# Patient Record
Sex: FEMALE | Race: WHITE | Employment: OTHER | URBAN - METROPOLITAN AREA
[De-identification: names, ages, dates, MRNs, and addresses within clinical notes are randomized per-mention and may not be internally consistent; named-entity substitution may affect disease eponyms.]

---

## 2018-02-15 ENCOUNTER — ANESTHESIA EVENT (OUTPATIENT)
Dept: SURGERY | Age: 73
End: 2018-02-15
Payer: MEDICARE

## 2018-02-15 ENCOUNTER — ANESTHESIA (OUTPATIENT)
Dept: SURGERY | Age: 73
End: 2018-02-15
Payer: MEDICARE

## 2018-02-15 ENCOUNTER — HOSPITAL ENCOUNTER (OUTPATIENT)
Age: 73
Setting detail: OUTPATIENT SURGERY
Discharge: HOME | End: 2018-02-15
Attending: OPHTHALMOLOGY | Admitting: OPHTHALMOLOGY
Payer: MEDICARE

## 2018-02-15 VITALS
WEIGHT: 126 LBS | SYSTOLIC BLOOD PRESSURE: 185 MMHG | HEART RATE: 69 BPM | TEMPERATURE: 98.6 F | OXYGEN SATURATION: 97 % | RESPIRATION RATE: 16 BRPM | DIASTOLIC BLOOD PRESSURE: 76 MMHG

## 2018-02-15 DIAGNOSIS — H44.001 ENDOPHTHALMITIS, ACUTE, RIGHT: Primary | ICD-10-CM

## 2018-02-15 LAB
GLUCOSE BLD STRIP.AUTO-MCNC: 93 MG/DL (ref 65–100)
POTASSIUM BLD-SCNC: 4.7 MMOL/L (ref 3.5–5.1)

## 2018-02-15 PROCEDURE — 76210000020 HC REC RM PH II FIRST 0.5 HR: Performed by: OPHTHALMOLOGY

## 2018-02-15 PROCEDURE — 74011250636 HC RX REV CODE- 250/636

## 2018-02-15 PROCEDURE — 87102 FUNGUS ISOLATION CULTURE: CPT | Performed by: OPHTHALMOLOGY

## 2018-02-15 PROCEDURE — 76210000063 HC OR PH I REC FIRST 0.5 HR: Performed by: OPHTHALMOLOGY

## 2018-02-15 PROCEDURE — 82962 GLUCOSE BLOOD TEST: CPT

## 2018-02-15 PROCEDURE — 87070 CULTURE OTHR SPECIMN AEROBIC: CPT | Performed by: OPHTHALMOLOGY

## 2018-02-15 PROCEDURE — 76060000032 HC ANESTHESIA 0.5 TO 1 HR: Performed by: OPHTHALMOLOGY

## 2018-02-15 PROCEDURE — 74011250636 HC RX REV CODE- 250/636: Performed by: OPHTHALMOLOGY

## 2018-02-15 PROCEDURE — 87075 CULTR BACTERIA EXCEPT BLOOD: CPT | Performed by: OPHTHALMOLOGY

## 2018-02-15 PROCEDURE — 74011000258 HC RX REV CODE- 258: Performed by: OPHTHALMOLOGY

## 2018-02-15 PROCEDURE — 77030018846 HC SOL IRR STRL H20 ICUM -A: Performed by: OPHTHALMOLOGY

## 2018-02-15 PROCEDURE — 74011000250 HC RX REV CODE- 250: Performed by: OPHTHALMOLOGY

## 2018-02-15 PROCEDURE — 77030032623 HC KT VITRCMY TOT PLS ALCN -F: Performed by: OPHTHALMOLOGY

## 2018-02-15 PROCEDURE — 74011250636 HC RX REV CODE- 250/636: Performed by: ANESTHESIOLOGY

## 2018-02-15 PROCEDURE — 77030003029 HC SUT VCRL J&J -B: Performed by: OPHTHALMOLOGY

## 2018-02-15 PROCEDURE — 77030018838 HC SOL IRR OPTH ALCN -B: Performed by: OPHTHALMOLOGY

## 2018-02-15 PROCEDURE — 76010000154 HC OR TIME FIRST 0.5 HR: Performed by: OPHTHALMOLOGY

## 2018-02-15 PROCEDURE — 84132 ASSAY OF SERUM POTASSIUM: CPT

## 2018-02-15 RX ORDER — NEOMYCIN SULFATE, POLYMYXIN B SULFATE, AND DEXAMETHASONE 3.5; 10000; 1 MG/G; [USP'U]/G; MG/G
OINTMENT OPHTHALMIC AS NEEDED
Status: DISCONTINUED | OUTPATIENT
Start: 2018-02-15 | End: 2018-02-15 | Stop reason: HOSPADM

## 2018-02-15 RX ORDER — LORAZEPAM 0.5 MG/1
0.5 TABLET ORAL
COMMUNITY

## 2018-02-15 RX ORDER — BUPIVACAINE HYDROCHLORIDE 5 MG/ML
INJECTION, SOLUTION EPIDURAL; INTRACAUDAL AS NEEDED
Status: DISCONTINUED | OUTPATIENT
Start: 2018-02-15 | End: 2018-02-15 | Stop reason: HOSPADM

## 2018-02-15 RX ORDER — LOSARTAN POTASSIUM 50 MG/1
50 TABLET ORAL 2 TIMES DAILY
COMMUNITY

## 2018-02-15 RX ORDER — LEVOTHYROXINE SODIUM 112 UG/1
112 TABLET ORAL
COMMUNITY

## 2018-02-15 RX ORDER — MULTIVIT WITH MINERALS/HERBS
1 TABLET ORAL DAILY
COMMUNITY

## 2018-02-15 RX ORDER — MIDAZOLAM HYDROCHLORIDE 1 MG/ML
INJECTION, SOLUTION INTRAMUSCULAR; INTRAVENOUS AS NEEDED
Status: DISCONTINUED | OUTPATIENT
Start: 2018-02-15 | End: 2018-02-15 | Stop reason: HOSPADM

## 2018-02-15 RX ORDER — ATROPINE SULFATE 10 MG/ML
SOLUTION/ DROPS OPHTHALMIC AS NEEDED
Status: DISCONTINUED | OUTPATIENT
Start: 2018-02-15 | End: 2018-02-15 | Stop reason: HOSPADM

## 2018-02-15 RX ORDER — INSULIN ASPART 100 [IU]/ML
INJECTION, SOLUTION INTRAVENOUS; SUBCUTANEOUS
COMMUNITY

## 2018-02-15 RX ORDER — TETRACAINE HYDROCHLORIDE 5 MG/ML
SOLUTION OPHTHALMIC AS NEEDED
Status: DISCONTINUED | OUTPATIENT
Start: 2018-02-15 | End: 2018-02-15 | Stop reason: HOSPADM

## 2018-02-15 RX ORDER — FLUTICASONE PROPIONATE 50 MCG
2 SPRAY, SUSPENSION (ML) NASAL DAILY
COMMUNITY

## 2018-02-15 RX ORDER — CHLORHEXIDINE GLUCONATE 1.2 MG/ML
15 RINSE ORAL 2 TIMES DAILY
COMMUNITY

## 2018-02-15 RX ORDER — TRIAMCINOLONE ACETONIDE 40 MG/ML
INJECTION, SUSPENSION INTRA-ARTICULAR; INTRAMUSCULAR AS NEEDED
Status: DISCONTINUED | OUTPATIENT
Start: 2018-02-15 | End: 2018-02-15 | Stop reason: HOSPADM

## 2018-02-15 RX ORDER — LIDOCAINE HYDROCHLORIDE 20 MG/ML
INJECTION, SOLUTION INFILTRATION; PERINEURAL AS NEEDED
Status: DISCONTINUED | OUTPATIENT
Start: 2018-02-15 | End: 2018-02-15 | Stop reason: HOSPADM

## 2018-02-15 RX ORDER — PANTOPRAZOLE SODIUM 40 MG/1
40 TABLET, DELAYED RELEASE ORAL DAILY
COMMUNITY

## 2018-02-15 RX ORDER — HYOSCYAMINE SULFATE 0.12 MG/1
0.12 TABLET SUBLINGUAL
COMMUNITY

## 2018-02-15 RX ORDER — SODIUM CHLORIDE, SODIUM LACTATE, POTASSIUM CHLORIDE, CALCIUM CHLORIDE 600; 310; 30; 20 MG/100ML; MG/100ML; MG/100ML; MG/100ML
75 INJECTION, SOLUTION INTRAVENOUS CONTINUOUS
Status: DISCONTINUED | OUTPATIENT
Start: 2018-02-15 | End: 2018-02-15 | Stop reason: HOSPADM

## 2018-02-15 RX ORDER — INSULIN GLARGINE 100 [IU]/ML
12 INJECTION, SOLUTION SUBCUTANEOUS
COMMUNITY

## 2018-02-15 RX ORDER — PREDNISOLONE ACETATE 10 MG/ML
SUSPENSION/ DROPS OPHTHALMIC AS NEEDED
Status: DISCONTINUED | OUTPATIENT
Start: 2018-02-15 | End: 2018-02-15 | Stop reason: HOSPADM

## 2018-02-15 RX ADMIN — VANCOMYCIN HYDROCHLORIDE 900 MG: 1 INJECTION, POWDER, LYOPHILIZED, FOR SOLUTION INTRAVENOUS at 12:17

## 2018-02-15 RX ADMIN — MIDAZOLAM HYDROCHLORIDE 0.5 MG: 1 INJECTION, SOLUTION INTRAMUSCULAR; INTRAVENOUS at 12:12

## 2018-02-15 RX ADMIN — SODIUM CHLORIDE, SODIUM LACTATE, POTASSIUM CHLORIDE, AND CALCIUM CHLORIDE 75 ML/HR: 600; 310; 30; 20 INJECTION, SOLUTION INTRAVENOUS at 12:00

## 2018-02-15 RX ADMIN — VANCOMYCIN HYDROCHLORIDE 100 MG: 1 INJECTION, POWDER, LYOPHILIZED, FOR SOLUTION INTRAVENOUS at 12:17

## 2018-02-15 RX ADMIN — CEFTAZIDIME 800 MG: 1 INJECTION, POWDER, FOR SOLUTION INTRAMUSCULAR; INTRAVENOUS at 12:17

## 2018-02-15 RX ADMIN — MIDAZOLAM HYDROCHLORIDE 0.5 MG: 1 INJECTION, SOLUTION INTRAMUSCULAR; INTRAVENOUS at 12:01

## 2018-02-15 RX ADMIN — CEFTAZIDIME 200 MG: 1 INJECTION, POWDER, FOR SOLUTION INTRAMUSCULAR; INTRAVENOUS at 12:17

## 2018-02-15 NOTE — H&P
[address]  HISTORY AND PHYSICAL      Madeline PRAKASH  MR#: [id number]  : 1945  ACCOUNT #: [de-identified]   ADMIT DATE: 02/15/2018    CHIEF COMPLAINT:  Decreased vision of the right eye. HISTORY OF PRESENT ILLNESS:  The patient is a 70-year-old white female with a long history of diabetes. She has developed diabetic macular edema of the right eye and underwent intraocular steroid injection of the right eye to help control the diabetic macular edema 3 days ago. Starting the evening after that injection, she began to notice a decreased vision. She called yesterday stating she had no pain in her vision but noticed a white streak at the bottom of the iris. She said her vision was cloudy as usual.  She came in this morning on 02/15/2018 where we found that she has a 10% hypopyon with conjunctival injection. The vision has dropped from 20/100 to counting fingers. She has an endophthalmitis of the right eye. She will be admitted for vitreous tap for samples for culture and injection of intraocular antibiotics consisting of vancomycin and ceftazidime. PAST MEDICAL HISTORY:  Positive for cataract surgery in both eyes, her long history of insulin-dependent type 2 diabetes, hypertension, thyroid disease, osteoarthritis. PAST SURGICAL HISTORY:  Include hysterectomy, knee surgery, D and C, tonsillectomy, deviated septum and the cataract surgeries. SOCIAL HISTORY:  She denies drinking and has never smoked. FAMILY HISTORY:  Noncontributory. MEDICATIONS:  Cephalexin 500 mg, Coreg, Humalog insulin, Lantus insulin and Synthroid. Her ophthalmic medications include Combigan one drop in the right eye twice a day. She takes PreserVision vitamins one twice a day and is on Prolensa drops twice a day in the right eye. ALLERGIES:  INCLUDE DEMEROL. PHYSICAL EXAMINATION:  She is awake, alert, oriented x3 with an appropriate affect.   Her eye examination shows the right eye vision to be counting fingers. The left eye vision is 20/25. She has a posterior chamber intraocular lenses in place with an open capsule in both eyes. The right eye, prior to this, had mild background diabetic retinopathy. The left eye has mild background diabetic retinopathy with no macular edema. She is a steroid responder and that is the reason that she is on glaucoma drops in the right eye. She has had previous steroid injections. Chest is clear bilaterally. Heart has a regular rate and rhythm. IMPRESSION:  Acute endophthalmitis of the right eye. PLAN:  Vitreous tap for cultures and intraocular injection of antibiotics. RUBÉN HANKINS  [facility] SINGH/JOSE RAUL  D: 02/15/2018 09:13     T: 02/15/2018 09:45  JOB #: [id number]

## 2018-02-15 NOTE — IP AVS SNAPSHOT
[address] [place] [address] 
[phone] Patient: Merlinda Bank MRN: [id number] :1945 A check srikanth indicates which time of day the medication should be taken. My Medications CONTINUE taking these medications Instructions Each Dose to Equal  
 Morning Noon Evening Bedtime B COMPLEX 1 tablet Generic drug:  b complex vitamins Your last dose was: Your next dose is: Take 1 Tab by mouth daily. 1 Tab FLONASE ALLERGY RELIEF 50 mcg/actuation nasal spray Generic drug:  fluticasone Your last dose was: Your next dose is: 2 Sprays by Both Nostrils route daily. 2 Spray  
    
   
   
   
  
 hyoscyamine SL 0.125 mg SL tablet Commonly known as:  LEVSIN/SL Your last dose was: Your next dose is:    
   
   
 0.125 mg by SubLINGual route every four (4) hours as needed for Cramping.  
 0.125 mg  
    
   
   
   
  
 LANTUS U-100 INSULIN 100 unit/mL injection Generic drug:  insulin glargine Your last dose was: Your next dose is:    
   
   
 12 Units by SubCUTAneous route nightly. Indications: 12units am 3 units hs  
 12 Units  
    
   
   
   
  
 levothyroxine 112 mcg tablet Commonly known as:  SYNTHROID Your last dose was: Your next dose is: Take 112 mcg by mouth Daily (before breakfast). 112 mcg LORazepam 0.5 mg tablet Commonly known as:  ATIVAN Your last dose was: Your next dose is: Take 0.5 mg by mouth nightly. 0.5 mg  
    
   
   
   
  
 losartan 50 mg tablet Commonly known as:  COZAAR Your last dose was: Your next dose is: Take 50 mg by mouth two (2) times a day. 50 mg NovoLOG U-100 Insulin aspart 100 unit/mL injection Generic drug:  insulin aspart U-100 Your last dose was: Your next dose is:    
   
   
 by SubCUTAneous route. Indications: sliding scale PERIDEX 0.12 % solution Generic drug:  chlorhexidine Your last dose was: Your next dose is:    
   
   
 15 mL by Swish and Spit route two (2) times a day. 15 mL PROBIOTIC 4X 10-15 mg Tbec Generic drug:  B.infantis-B.ani-B.long-B.bifi Your last dose was: Your next dose is: Take  by mouth. PROTONIX 40 mg tablet Generic drug:  pantoprazole Your last dose was: Your next dose is: Take 40 mg by mouth daily.   
 40 mg

## 2018-02-15 NOTE — IP AVS SNAPSHOT
[address] [facility] [place], operated by [facility] 
 
 
 [address] [facility] [address] [facility] 
[phone] Patient: Brian Vizcarra MRN: [id number] :1945 About your hospitalization You were admitted on:  February 15, 2018 You last received care in the:  [facility] OP PACU You were discharged on:  February 15, 2018 Why you were hospitalized Your primary diagnosis was:  Not on File Follow-up Information Follow up With Details Comments Contact Info Davy Cobb MD   [address] [place] [address] [address] [facility] [address] 
[phone] Abilio Wilde MD  as scheduled [address] [employer] [address] [place] [address] 
[phone] Discharge Orders Procedure Order Date Status Priority Quantity Spec Type Associated Dx NURSING-MISCELLANEOUS: positioning-head up tonight 02/15/18 1242 Normal Routine 1  Endophthalmitis, acute, right [[id number]] Questions: Description of Order:  positioning-head up tonight DISCONTINUE IV 02/15/18 1242 Future Routine 1  Endophthalmitis, acute, right [[id number]] Comments:  Discontinue IV once tolerating PO well DRESSING,DO NOT REMOVE 02/15/18 124 Normal Routine 1  Endophthalmitis, acute, right [[id number]] Comments:  Keep the patch and shield in place at all times until you return to my office as scheduled DIET DIABETIC CONSISTENT CARB 02/15/18 1242 Normal Routine 1  Endophthalmitis, acute, right [[id number]] Comments:  Advance as tolerated Questions: Total Calories:  Consistent Carb 1500-1600kcal  
        
  
 A check srikanth indicates which time of day the medication should be taken. My Medications CONTINUE taking these medications Instructions Each Dose to Equal  
 Morning Noon Evening Bedtime B COMPLEX 1 tablet Generic drug:  b complex vitamins Your last dose was: Your next dose is: Take 1 Tab by mouth daily. 1 Tab FLONASE ALLERGY RELIEF 50 mcg/actuation nasal spray Generic drug:  fluticasone Your last dose was: Your next dose is: 2 Sprays by Both Nostrils route daily. 2 Spray  
    
   
   
   
  
 hyoscyamine SL 0.125 mg SL tablet Commonly known as:  LEVSIN/SL Your last dose was: Your next dose is:    
   
   
 0.125 mg by SubLINGual route every four (4) hours as needed for Cramping.  
 0.125 mg  
    
   
   
   
  
 LANTUS U-100 INSULIN 100 unit/mL injection Generic drug:  insulin glargine Your last dose was: Your next dose is:    
   
   
 12 Units by SubCUTAneous route nightly. Indications: 12units am 3 units hs  
 12 Units  
    
   
   
   
  
 levothyroxine 112 mcg tablet Commonly known as:  SYNTHROID Your last dose was: Your next dose is: Take 112 mcg by mouth Daily (before breakfast). 112 mcg LORazepam 0.5 mg tablet Commonly known as:  ATIVAN Your last dose was: Your next dose is: Take 0.5 mg by mouth nightly. 0.5 mg  
    
   
   
   
  
 losartan 50 mg tablet Commonly known as:  COZAAR Your last dose was: Your next dose is: Take 50 mg by mouth two (2) times a day. 50 mg NovoLOG U-100 Insulin aspart 100 unit/mL injection Generic drug:  insulin aspart U-100 Your last dose was: Your next dose is:    
   
   
 by SubCUTAneous route. Indications: sliding scale PERIDEX 0.12 % solution Generic drug:  chlorhexidine Your last dose was: Your next dose is:    
   
   
 15 mL by Swish and Spit route two (2) times a day. 15 mL PROBIOTIC 4X 10-15 mg Tbec Generic drug:  B.infantis-B.ani-B.long-B.bifi Your last dose was: Your next dose is: Take  by mouth. PROTONIX 40 mg tablet Generic drug:  pantoprazole Your last dose was: Your next dose is: Take 40 mg by mouth daily. 40 mg Discharge Instructions [address] Instructions For Home Care After Outpatient Retina Surgery 1. Leave the protective eye shield taped in place until you visit the physician. 2. Protect the eye at all times by wearing either your glasses or your eye shield. 3. Always wear the shield when sleeping. 4. You may be up to the bathroom with assistance. 5. Positioning and Activity: head up tonight 6. Avoid straining (as with bowel movements). 7. Avoid heavy lifting (no more than 5 pounds). 8. Avoid bending over. Bend at the knees to retrieve something from the floor. 9. Avoid squeezing your eye lids together. 10. If you have excessive nausea and vomiting, call your doctor. 11. If you have mild pain, take Extra Strength Tylenol. If your pain is sharp, and/or unrelieved by your prescribed pain medication, call your doctor. 12. You may watch television and you may read. 13. You may resume your normal diet. 15. You may resume your medications taken routinely. 15. Take your eye box with you to your doctor's appointment. Doctor's Phone Number: [phone] Follow-Up Doctor's Appointment: 10AM my office tomorrow I have received these instructions, reviewed them with nursing personnel, and understand the content. Patient or Representative Signature:  
 
                                                                              2/15/2018 Discharging Nurse:  
Treva Gomez MD 2/15/2018 DIET · Clear liquids until no nausea or vomiting; then light diet for the first day. · Advance to regular diet on second day, unless your doctor orders otherwise. · If nausea and vomiting continues, call your doctor. AFTER ANESTHESIA · For the first 24 hours: DO NOT Drive, Drink alcoholic beverages, or Make important decisions. · Be aware of dizziness following anesthesia and while taking pain medication. DISCHARGE SUMMARY from Nurse PATIENT INSTRUCTIONS: 
 
After general anesthesia or intravenous sedation, for 24 hours or while taking prescription Narcotics: · Limit your activities · Do not drive and operate hazardous machinery · Do not make important personal or business decisions · Do  not drink alcoholic beverages · If you have not urinated within 8 hours after discharge, please contact your surgeon on call. *  Please give a list of your current medications to your Primary Care Provider. *  Please update this list whenever your medications are discontinued, doses are 
    changed, or new medications (including over-the-counter products) are added. *  Please carry medication information at all times in case of emergency situations. These are general instructions for a healthy lifestyle: No smoking/ No tobacco products/ Avoid exposure to second hand smoke Surgeon General's Warning:  Quitting smoking now greatly reduces serious risk to your health. Obesity, smoking, and sedentary lifestyle greatly increases your risk for illness A healthy diet, regular physical exercise & weight monitoring are important for maintaining a healthy lifestyle You may be retaining fluid if you have a history of heart failure or if you experience any of the following symptoms:  Weight gain of 3 pounds or more overnight or 5 pounds in a week, increased swelling in our hands or feet or shortness of breath while lying flat in bed. Please call your doctor as soon as you notice any of these symptoms; do not wait until your next office visit. Recognize signs and symptoms of STROKE: 
 
F-face looks uneven A-arms unable to move or move unevenly S-speech slurred or non-existent T-time-call 911 as soon as signs and symptoms begin-DO NOT go Back to bed or wait to see if you get better-TIME IS BRAIN. Introducing [facility]! Alexei Palacios introduces EZ-Ticket patient portal. Now you can access parts of your medical record, email your doctor's office, and request medication refills online. 1. In your internet browser, go to [email or web address] 2. Click on the First Time User? Click Here link in the Sign In box. You will see the New Member Sign Up page. 3. Enter your EZ-Ticket Access Code exactly as it appears below. You will not need to use this code after youve completed the sign-up process. If you do not sign up before the expiration date, you must request a new code. · EZ-Ticket Access Code: [email or web address] Expires: 5/16/2018 12:44 PM 
 
4. Enter the last four digits of your Social Security Number (xxxx) and Date of Birth (mm/dd/yyyy) as indicated and click Submit. You will be taken to the next sign-up page. 5. Create a EZ-Ticket ID. This will be your EZ-Ticket login ID and cannot be changed, so think of one that is secure and easy to remember. 6. Create a EZ-Ticket password. You can change your password at any time. 7. Enter your Password Reset Question and Answer. This can be used at a later time if you forget your password. 8. Enter your e-mail address. You will receive e-mail notification when new information is available in [address] 9. Click Sign Up. You can now view and download portions of your medical record. 10. Click the Download Summary menu link to download a portable copy of your medical information. If you have questions, please visit the Frequently Asked Questions section of the EZ-Ticket website. Remember, EZ-Ticket is NOT to be used for urgent needs. For medical emergencies, dial 911. Now available from your iPhone and Android! Unresulted Labs-Please follow up with your PCP about these lab tests Order Current Status CULTURE, ANAEROBIC In process CULTURE, EYE In process FUNGUS CULTURE AND SMEAR In process Providers Seen During Your Hospitalization Provider Specialty Primary office phone Monique Vann MD Ophthalmology [phone] Your Primary Care Physician (PCP) Primary Care Physician Office Phone Office Fax Ana Lewis [phone] You are allergic to the following Allergen Reactions Latex Rash Amlodipine Unknown (comments) Codeine Rash Demerol (Meperidine) Nausea and Vomiting Dicyclomine Unknown (comments) Levofloxacin Hives  
 swelling Sulfa (Sulfonamide Antibiotics) Other (comments) Pulls sodium out of blood Tetracycline Unknown (comments) Recent Documentation Weight OB Status 57.2 kg Hysterectomy Emergency Contacts Name Discharge Info Relation Home Work Mobile George Adams  Spouse [3] [phone] Patient Belongings The following personal items are in your possession at time of discharge: 
  Dental Appliances: None         Home Medications: None   Jewelry: None  Clothing: Shirt, Pants    Other Valuables: None Please provide this summary of care documentation to your next provider. Signatures-by signing, you are acknowledging that this After Visit Summary has been reviewed with you and you have received a copy. Patient Signature:  ____________________________________________________________ Date:  ____________________________________________________________  
  
Rachael Cook Provider Signature:  ____________________________________________________________ Date:  ____________________________________________________________

## 2018-02-15 NOTE — ANESTHESIA PREPROCEDURE EVALUATION
Anesthetic History   No history of anesthetic complications            Review of Systems / Medical History  Patient summary reviewed, nursing notes reviewed and pertinent labs reviewed    Pulmonary  Within defined limits                 Neuro/Psych   Within defined limits           Cardiovascular    Hypertension                   GI/Hepatic/Renal     GERD: well controlled           Endo/Other    Diabetes  Hypothyroidism: well controlled       Other Findings            Physical Exam    Airway  Mallampati: II      Mouth opening: Normal     Cardiovascular  Regular rate and rhythm,  S1 and S2 normal,  no murmur, click, rub, or gallop             Dental  No notable dental hx       Pulmonary  Breath sounds clear to auscultation               Abdominal         Other Findings            Anesthetic Plan    ASA: 2, emergent  Anesthesia type: MAC          Induction: Intravenous  Anesthetic plan and risks discussed with: Patient and Spouse

## 2018-02-15 NOTE — OP NOTES
[facility] REPORT    Madeline PRAKASH  MR#: [id number]  : 1945  ACCOUNT #: [de-identified]   DATE OF SERVICE: 02/15/2018    PREOPERATIVE DIAGNOSIS:  Acute endophthalmitis of the right eye. POSTOPERATIVE DIAGNOSIS:  Acute endophthalmitis of the right eye. PROCEDURES PERFORMED:  Vitreous tap of the right eye with injection of intraocular antibiotics. SURGEON:  Jessika Hope MD    ASSISTANT:     ANESTHESIA:  Retrobulbar injection of the right eye. ESTIMATED BLOOD LOSS: minimal    SPECIMENS REMOVED: vitreous for C&S and gram stain    COMPLICATIONS:  None. IMPLANTS:     OPERATIVE PROCEDURE:  The patient was brought to the operating room and placed in the supine position. A retrobulbar injection was given with the eye looking straight ahead with 5 mL of a 50/50 mixture of 2% plain Xylocaine and 0.75% Marcaine with the eye looking straight ahead. Pressure was applied to the orbit to prevent any bleeding and the right eye was then prepped and draped in the normal sterile manner. A lid speculum was inserted. One trocar was placed at the 9:30 position, 3.5 mm posterior to the limbus. Under direct visualization, the vitrectomy probe was placed in the space just behind the intraocular lens and 0.3 mL of  fluid was removed. This will be sent to the lab for aerobic and anaerobic cultures, fungal cultures and Gram stain. I will check on that as soon as I am done with this procedure. To make up for the volume, we injected vancomycin 1 mg in 0.1 mL and ceftazidime 2 mg and 0.1 mL into the eye. This did leave the eye soft. One 8-0 Vicryl suture was placed after we removed the trocar at the 9:30 position. A 0.5 mL of vancomycin 25 mg was injected subconjunctivally inferiorly. One 0.5 mL of ceftazidime 50 mg was injected subconjunctivally inferiorly. One 0.5 mL of Kenalog 20 mg was injected subconjunctivally inferiorly.   One drop of atropine 1%, one drop Pred Forte 1% and Maxitrol ointment were instilled in the eye and a sterile patch and shield were then taped in position over the eye. The patient was placed head up and taken to recovery room. She is to keep the patch and shield in place at all times. Keep the eye dry. I will give her prescriptions for tramadol 50 mg 1 every 6 hours p.r.n. pain, dispensed 10 with no refills. She is to take all of her normal medications and to stay on her diabetic diet. She has been encouraged to call my office if she has any difficulties this evening. Again, I will see her tomorrow at 10:00 in my office. RUBÉN HANKINS  [facility] Femi  D: 02/15/2018 12:47     T: 02/15/2018 13:34  JOB #: [id number]

## 2018-02-15 NOTE — DISCHARGE INSTRUCTIONS
[address] [facility] Instructions For Home Care After Outpatient Retina Surgery      1. Leave the protective eye shield taped in place until you visit the physician. 2. Protect the eye at all times by wearing either your glasses or your eye shield. 3. Always wear the shield when sleeping. 4. You may be up to the bathroom with assistance. 5. Positioning and Activity: head up tonight   6. Avoid straining (as with bowel movements). 7. Avoid heavy lifting (no more than 5 pounds). 8. Avoid bending over. Bend at the knees to retrieve something from the floor. 9. Avoid squeezing your eye lids together. 10. If you have excessive nausea and vomiting, call your doctor. 11. If you have mild pain, take Extra Strength Tylenol. If your pain is sharp, and/or unrelieved by your prescribed pain medication, call your doctor. 12. You may watch television and you may read. 13. You may resume your normal diet. 15. You may resume your medications taken routinely. 15. Take your eye box with you to your doctor's appointment. Doctor's Phone Number: [phone]    Follow-Up Doctor's Appointment: 10AM my office tomorrow    I have received these instructions, reviewed them with nursing personnel, and understand the content. Patient or Representative Signature:                                                                                   2/15/2018       Discharging Nurse:   Taylor Lomas MD 2/15/2018       DIET  · Clear liquids until no nausea or vomiting; then light diet for the first day. · Advance to regular diet on second day, unless your doctor orders otherwise. · If nausea and vomiting continues, call your doctor. AFTER ANESTHESIA   · For the first 24 hours: DO NOT Drive, Drink alcoholic beverages, or Make important decisions. · Be aware of dizziness following anesthesia and while taking pain medication.            DISCHARGE SUMMARY from Nurse    PATIENT INSTRUCTIONS:    After general anesthesia or intravenous sedation, for 24 hours or while taking prescription Narcotics:  · Limit your activities  · Do not drive and operate hazardous machinery  · Do not make important personal or business decisions  · Do  not drink alcoholic beverages  · If you have not urinated within 8 hours after discharge, please contact your surgeon on call. *  Please give a list of your current medications to your Primary Care Provider. *  Please update this list whenever your medications are discontinued, doses are      changed, or new medications (including over-the-counter products) are added. *  Please carry medication information at all times in case of emergency situations. These are general instructions for a healthy lifestyle:    No smoking/ No tobacco products/ Avoid exposure to second hand smoke    Surgeon General's Warning:  Quitting smoking now greatly reduces serious risk to your health. Obesity, smoking, and sedentary lifestyle greatly increases your risk for illness    A healthy diet, regular physical exercise & weight monitoring are important for maintaining a healthy lifestyle    You may be retaining fluid if you have a history of heart failure or if you experience any of the following symptoms:  Weight gain of 3 pounds or more overnight or 5 pounds in a week, increased swelling in our hands or feet or shortness of breath while lying flat in bed. Please call your doctor as soon as you notice any of these symptoms; do not wait until your next office visit. Recognize signs and symptoms of STROKE:    F-face looks uneven    A-arms unable to move or move unevenly    S-speech slurred or non-existent    T-time-call 911 as soon as signs and symptoms begin-DO NOT go       Back to bed or wait to see if you get better-TIME IS BRAIN.

## 2018-02-18 LAB
BACTERIA SPEC CULT: NORMAL
BACTERIA SPEC CULT: NORMAL
GRAM STN SPEC: NORMAL
SERVICE CMNT-IMP: NORMAL

## 2018-02-23 LAB
BACTERIA SPEC CULT: NORMAL
SERVICE CMNT-IMP: NORMAL

## 2018-03-15 LAB
FUNGUS CULTURE, RFCO2T: NORMAL
FUNGUS SMEAR, RFCO1T: NORMAL
FUNGUS SPEC CULT: NORMAL
FUNGUS STAIN, 188244: NORMAL
REFLEX TO ID, RFCO3T: NORMAL
SPECIMEN SOURCE: NORMAL
SPECIMEN SOURCE: NORMAL

## 2022-07-26 ENCOUNTER — HOSPITAL ENCOUNTER (OUTPATIENT)
Dept: LAB | Age: 77
Discharge: HOME | End: 2022-07-29

## 2022-07-26 PROCEDURE — 88305 TISSUE EXAM BY PATHOLOGIST: CPT

## 2023-12-14 ENCOUNTER — HOME HEALTH ADMISSION (OUTPATIENT)
Dept: HOME HEALTH SERVICES | Facility: HOME HEALTH | Age: 78
End: 2023-12-14

## (undated) DEVICE — SOLUTION IV STRL H2O 500 ML AQUALITE POUR BTL

## (undated) DEVICE — SUT VCRL 8-0 12IN TG1408 VIO --

## (undated) DEVICE — PAD,EYE,1-5/8X2 5/8,STERILE,LF,1/PK: Brand: MEDLINE

## (undated) DEVICE — (D)STRIP SKN CLSR 0.5X4IN WHT --

## (undated) DEVICE — SOLUTION IRRIGATION BAL SALT SOLUTION 500 ML BTL 6/CA BSS +

## (undated) DEVICE — LARGE BORE STOPCOCK WITH ROTATING MALE LUER LOCK

## (undated) DEVICE — 3 ML SYRINGE LUER-LOCK TIP: Brand: MONOJECT

## (undated) DEVICE — SYRINGE MED 3ML NDL 23GA L1IN PLAS N CTRL LUERLOCK TIP REG

## (undated) DEVICE — SURGICAL PROCEDURE PACK EYE CDS

## (undated) DEVICE — CONSTELLATION VISION SYSTEM 5000 CPM ULTRAVIT PROBE STRAIGHT ENDOILLUMINATOR 25+ TOTALPLUS VITRECTOMY PAK EDGEPLUS BLADE VALVED ENTRY SYSTEM: Brand: CONSTELLATION, ULTRAVIT, 25+, TOTALPLUS, EDGEPLUS

## (undated) DEVICE — CARDINAL HEALTH FLEXIBLE LIGHT HANDLE COVER: Brand: CARDINAL HEALTH